# Patient Record
Sex: MALE | Race: WHITE
[De-identification: names, ages, dates, MRNs, and addresses within clinical notes are randomized per-mention and may not be internally consistent; named-entity substitution may affect disease eponyms.]

---

## 2017-01-21 ENCOUNTER — HOSPITAL ENCOUNTER (EMERGENCY)
Dept: HOSPITAL 25 - UCEAST | Age: 42
Discharge: HOME | End: 2017-01-21
Payer: COMMERCIAL

## 2017-01-21 VITALS — SYSTOLIC BLOOD PRESSURE: 128 MMHG | DIASTOLIC BLOOD PRESSURE: 85 MMHG

## 2017-01-21 DIAGNOSIS — Z88.1: ICD-10-CM

## 2017-01-21 DIAGNOSIS — Z88.5: ICD-10-CM

## 2017-01-21 DIAGNOSIS — R51: Primary | ICD-10-CM

## 2017-01-21 PROCEDURE — G0463 HOSPITAL OUTPT CLINIC VISIT: HCPCS

## 2017-01-21 PROCEDURE — 99212 OFFICE O/P EST SF 10 MIN: CPT

## 2017-01-21 NOTE — UC
Headache HPI





- HPI Summary


HPI Summary: 





The patient comes in today for:





1.   Headache:





Onset: 2-3 weeks ago. 


Palliative/provocative:  Nothing makes the pain better or worse.  


Quality: Throbbing.


Region:  Upper right tooth, or right maxillary sinus area pain.


Severity:  4/10


Time: Comes and goes. 


Associated symptoms:


   FEver: None.


   Rhinitis: None.


   Nasal congestion: None


   Epistaxis: None.


   Numbness: Right lateral corner of the eye. This started up today. It comes 

and goes.  


   Previous treatment:  He has seen a dentist x 3 in the last 10 days and the 

last visit was yesterday.  The patient states that his dentist did not think 

that he had a tooth problem and the patient has been referred to an endodontist 

(3 weeks from now).  He was started on Amoxicillin last one week ago. 








*








- History Of Current Complaint


Chief Complaint: UCDentalProblem


Stated Complaint: FACIAL PAIN


Hx Obtained From: Patient, Family/Caretaker





- Allergies/Home Medications


Allergies/Adverse Reactions: 


 Allergies











Allergy/AdvReac Type Severity Reaction Status Date / Time


 


Cefaclor [From [facility]] Allergy  Itching Verified 01/21/17 22:01


 


Morphine Allergy  Itching Verified 01/21/17 22:01











Home Medications: 


 Home Medications





Amoxicillin CAP* 500 mg PO TID 01/21/17 [History Confirmed 01/21/17]











PMH/Surg Hx/FS Hx/Imm Hx


Previously Healthy: Yes


Endocrine History Of: 


   Denies: Diabetes, Thyroid Disease, Hyperthyroidism, Hypothyroidism, 

Dyslipidemia


Cardiovascular History Of: 


   Denies: Cardiac Disorders, Hypertension, Pacemaker/ICD, Myocardial Infarction

, Congestive Heart Failure, Atrial Fibrillation, Deep Vein Thrombosis, Bleeding 

Disorders


Respiratory History Of: 


   Denies: COPD, Asthma, Bronchitis, Pneumonia, Pulmonary Embolism


GI/ History Of: 


   Denies: Gastroesophageal Reflux, Ulcer, Gastrointestinal Bleed, Gall Bladder 

Disease, Kidney Stones, Diverticulitis, Renal Disease, Urosepsis


Neurological History Of: 


   Denies: TIA, CVA, Dementia, Seizures, Migraine


Psychological History Of: 


   Denies: Anxiety, Depression, Bipolar Disorder, Schizophrenia, Post Traumatic 

Stress Disorder


Cancer History Of: 


   Denies: Lung Cancer, Colorectal Cancer, Breast Cancer, Prostate Cancer, 

Cervical Cancer


Other History Of: 


   Negative For: HIV, Hepatitis B, Hepatitis C, Anticoagulant Therapy





- Surgical History


Surgical History: Yes


Surgery Procedure, Year, and Place: TONSILS 2008





- Family History


Known Family History: Positive: Cardiac Disease, Hypertension





- Social History


Occupation: Employed Full-time


Alcohol Use: None


Substance Use Type: None


Smoking Status (MU): Never Smoked Tobacco





Review of Systems


Constitutional: Negative


Skin: Negative


Eyes: Negative


ENT: Negative


Respiratory: Negative


Cardiovascular: Negative


Gastrointestinal: Negative


Genitourinary: Negative


Musculoskeletal: Arthralgia, Myalgia


All Other Systems Reviewed And Are Negative: Yes





Physical Exam


Triage Information Reviewed: Yes


Appearance: Well-Appearing, No Pain Distress, Thin


Vital Signs: 


 Initial Vital Signs











Temp  99 F   01/21/17 21:58


 


Pulse  71   01/21/17 21:58


 


Resp  16   01/21/17 21:58


 


BP  128/85   01/21/17 21:58


 


Pulse Ox  100   01/21/17 21:58











Vital Signs Reviewed: Yes


Eyes: Positive: Conjunctiva Clear.  Negative: Discharge


ENT: Positive: Hearing grossly normal, Other: - No tenderness to palpation of 

the right side of the head.  There is no percussion tenderness of lesions of 

the oral cavity.  There is slight clicking of the TMJ bilaterally, but no 

tenderness to palpation of either joint.  There is transillumication of the 

frontal and maxillary sinuses..  Negative: Pharyngeal erythema, Nasal congestion

, Nasal drainage, TM bulging, TM dull, TM red, Tonsillar swelling, Tonsillar 

exudate


Dental: Negative: Gross Decay/Caries @, Dental Fracture @


Neck: Positive: Supple, Nontender, No Lymphadenopathy.  Negative: Nuchal 

Rigidity


Respiratory: Positive: Lungs clear, No respiratory distress, No accessory 

muscle use.  Negative: Rhonchi, Wheezing


Cardiovascular: Positive: RRR, No Murmur


Abdomen Description: Positive: Nontender, No Organomegaly, Soft.  Negative: 

Distended, Guarding


Musculoskeletal: Positive: Strength Intact, ROM Intact, No Edema


Neurological: Positive: Alert, Muscle Tone Normal


Psychological: Positive: Normal Response To Family, Age Appropriate Behavior, 

Consolable


Skin: Negative: rashes, breakdown





Headache Course/Dx





- Differential Dx/Diagnosis


Provider Diagnoses: Cephalgia, right side of head.





Discharge





- Discharge Plan


Condition: Stable


Disposition: HOME


Patient Education Materials:  General Headache (ED)


Referrals: 


Ramon Marin MD [Primary Care Provider] - 


Cryer,Jonathan, MD [Medical Doctor] - As Soon As Possible (Please contact Dr. Cryer's office as soon as you can for an appointment.)

## 2017-05-26 ENCOUNTER — HOSPITAL ENCOUNTER (EMERGENCY)
Dept: HOSPITAL 25 - UCEAST | Age: 42
Discharge: ANOTHER HOSPITAL | End: 2017-05-26
Payer: COMMERCIAL

## 2017-05-26 ENCOUNTER — HOSPITAL ENCOUNTER (EMERGENCY)
Dept: HOSPITAL 25 - ED | Age: 42
Discharge: HOME | End: 2017-05-26
Payer: COMMERCIAL

## 2017-05-26 VITALS — DIASTOLIC BLOOD PRESSURE: 77 MMHG | SYSTOLIC BLOOD PRESSURE: 128 MMHG

## 2017-05-26 VITALS — SYSTOLIC BLOOD PRESSURE: 130 MMHG | DIASTOLIC BLOOD PRESSURE: 90 MMHG

## 2017-05-26 DIAGNOSIS — Z88.5: ICD-10-CM

## 2017-05-26 DIAGNOSIS — M79.661: Primary | ICD-10-CM

## 2017-05-26 DIAGNOSIS — M77.9: ICD-10-CM

## 2017-05-26 DIAGNOSIS — Z88.1: ICD-10-CM

## 2017-05-26 LAB
ALBUMIN SERPL BCG-MCNC: 4.5 G/DL (ref 3.2–5.2)
ALP SERPL-CCNC: 84 U/L (ref 34–104)
ALT SERPL W P-5'-P-CCNC: 18 U/L (ref 7–52)
ANION GAP SERPL CALC-SCNC: 6 MMOL/L (ref 2–11)
AST SERPL-CCNC: 23 U/L (ref 13–39)
BUN SERPL-MCNC: 10 MG/DL (ref 6–24)
BUN/CREAT SERPL: 11.6 (ref 8–20)
CALCIUM SERPL-MCNC: 9.6 MG/DL (ref 8.6–10.3)
CHLORIDE SERPL-SCNC: 107 MMOL/L (ref 101–111)
GLOBULIN SER CALC-MCNC: 2.5 G/DL (ref 2–4)
GLUCOSE SERPL-MCNC: 105 MG/DL (ref 70–100)
HCO3 SERPL-SCNC: 26 MMOL/L (ref 22–32)
HCT VFR BLD AUTO: 47 % (ref 42–52)
HGB BLD-MCNC: 15.9 G/DL (ref 14–18)
MCH RBC QN AUTO: 31 PG (ref 27–31)
MCHC RBC AUTO-ENTMCNC: 34 G/DL (ref 31–36)
MCV RBC AUTO: 92 FL (ref 80–94)
POTASSIUM SERPL-SCNC: 3.8 MMOL/L (ref 3.5–5)
PROT SERPL-MCNC: 7 G/DL (ref 6.4–8.9)
RBC # BLD AUTO: 5.06 10^6/UL (ref 4–5.4)
SODIUM SERPL-SCNC: 139 MMOL/L (ref 133–145)
WBC # BLD AUTO: 5.3 10^3/UL (ref 3.5–10.8)

## 2017-05-26 PROCEDURE — 99212 OFFICE O/P EST SF 10 MIN: CPT

## 2017-05-26 PROCEDURE — 80053 COMPREHEN METABOLIC PANEL: CPT

## 2017-05-26 PROCEDURE — 36415 COLL VENOUS BLD VENIPUNCTURE: CPT

## 2017-05-26 PROCEDURE — 85379 FIBRIN DEGRADATION QUANT: CPT

## 2017-05-26 PROCEDURE — 99282 EMERGENCY DEPT VISIT SF MDM: CPT

## 2017-05-26 PROCEDURE — 93005 ELECTROCARDIOGRAM TRACING: CPT

## 2017-05-26 PROCEDURE — 85025 COMPLETE CBC W/AUTO DIFF WBC: CPT

## 2017-05-26 PROCEDURE — G0463 HOSPITAL OUTPT CLINIC VISIT: HCPCS

## 2017-05-26 NOTE — ED
Lower Extremity





- HPI Summary


HPI Summary: 





42 male presents from urgent care with complaints of lower right leg pain that 

began 2 days ago and worsened last night. Patient was sent here to rule out 

DVT. Patient is a very healthy amie who runs long distance daily. He has had 

chronic previous issues with both achilles tendons and lower leg muscles. He 

admits to this episode's pain being worse, more diffusely in his posterior calf 

and swelling that has resolved some, just in his right lower leg, since 

yesterday. Patient knows of a friend how just passed away from a undiagnosed 

PE. He has been taking Advil which does help with the pain and swelling. He is 

concerned he may have some. He also admits to some chest tightness that comes 

and goes however he admits to being anxious and relates it to that. Denies SOB 

and difficulty breathing. Is able to walk and bear weight. Running makes pain 

worse. Denies numbness/tingling and erythema. Denies any PMHx. He is vegan.





- History of Current Complaint


Chief Complaint: EDExtremityLower


Stated Complaint: R/O DVT-SENT FROM [facility]


Time Seen by Provider: 05/26/17 18:58


Hx Obtained From: Patient


Mechanism Of Injury: Unknown - running, overuse


Onset of Pain: Immediate


Onset/Duration: Days


Severity Initially: Mild


Severity Currently: Mild


Pain Intensity: 5


Pain Scale Used: 0-10 Numeric


Timing: Constant


Location: Is Discrete @ - lower right calf, leg


Character Of Pain: Sharp, Aching


Associated Signs And Symptoms: Positive: Swelling


Aggravating Factor(s): Standing, Ambulation


Alleviating Factor(s): Rest


Able to Bear Weight: Yes





- Allergies/Home Medications


Allergies/Adverse Reactions: 


 Allergies











Allergy/AdvReac Type Severity Reaction Status Date / Time


 


Cefaclor [From [facility]] Allergy  Itching Verified 05/26/17 17:23


 


Morphine Allergy  Itching Verified 05/26/17 17:23














PMH/Surg Hx/FS Hx/Imm Hx


Endocrine/Hematology History: 


   Denies: Hx Anticoagulant Therapy, Hx Diabetes, Hx Thyroid Disease


Cardiovascular History: 


   Denies: Hx Congestive Heart Failure, Hx Deep Vein Thrombosis, Hx 

Hypercholesterolemia, Hx Hypertension, Hx Myocardial Infarction, Hx Pacemaker/

ICD, Hx Peripheral Vascular Disease


Respiratory History: 


   Denies: Hx Asthma, Hx Chronic Obstructive Pulmonary Disease (COPD), Hx Lung 

Cancer, Hx Pneumonia, Hx Pulmonary Embolism


GI History: 


   Denies: Hx Gall Bladder Disease, Hx Gastrointestinal Bleed, Hx Ulcer, Hx 

Urosepsis


 History: 


   Denies: Hx Kidney Stones, Hx Renal Disease


Musculoskeletal History: 


   Denies: Hx Arthritis, Hx Rheumatoid Arthritis, Hx Osteoporosis


Sensory History: 


   Denies: Hx Cataracts, Hx Contacts or Glasses, Hx Glaucoma, Hx Hearing Aid


Opthamlomology History: 


   Denies: Hx Cataracts, Hx Contacts or Glasses, Hx Glaucoma


Neurological History: 


   Denies: Hx Dementia, Hx Headaches, Hx Migraine, Hx Seizures, Hx Transient 

Ischemic Attacks (TIA)


Psychiatric History: 


   Denies: Hx Anxiety, Hx Depression, Hx Panic Disorder, Hx Schizophrenia, Hx 

Bipolar Disorder





- Surgical History


Surgery Procedure, Year, and Place: TONSILS


Infectious Disease History: No


Infectious Disease History: 


   Denies: Traveled Outside the [place] in Last 30 Days





- Family History


Known Family History: Positive: Cardiac Disease, Hypertension





- Social History


Alcohol Use: None


Hx Substance Use: No


Substance Use Type: Reports: None


Hx Tobacco Use: No


Smoking Status (MU): Never Smoked Tobacco





Review of Systems


Constitutional: Negative


Cardiovascular: Negative


Respiratory: Negative


Gastrointestinal: Negative


Positive: Arthralgia, Myalgia, Edema - right lower leg 


Skin: Negative


Neurological: Negative


All Other Systems Reviewed And Are Negative: Yes





Physical Exam


Triage Information Reviewed: Yes


Vital Signs On Initial Exam: 


 Initial Vitals











Temp Pulse Resp BP Pulse Ox


 


 98.2 F   58   18   132/80   100 


 


 05/26/17 18:08  05/26/17 18:08  05/26/17 18:08  05/26/17 18:08  05/26/17 18:08








not tachy and not hypoxic


Vital Signs Reviewed: Yes


Appearance: Positive: Well-Appearing, No Pain Distress, Well-Nourished, Thin


Skin: Positive: Warm, Skin Color Reflects Adequate Perfusion, Dry.  Negative: 

Cold, Numb, Cyanosis @, Erythema @


Head/Face: Positive: Normal Head/Face Inspection


Eyes: Positive: Normal, Conjunctiva Clear


ENT: Positive: Normal ENT inspection, Hearing grossly normal


Neck: Positive: Supple, Nontender


Respiratory/Lung Sounds: Positive: Clear to Auscultation, Breath Sounds 

Present.  Negative: Decreased Breath Sounds, Rales, Rhonchi, Stridor, Wheezes


Cardiovascular: Positive: Normal, RRR, Pulses are Symmetrical in both Upper and 

Lower Extremities - 2+ pedal b/l, Leg Edema Right - minmal edema when compared 

to left.  Negative: Murmur, Rub, Leg Edema Left


Abdomen Description: Positive: Nontender, Soft


Bowel Sounds: Positive: Present


Musculoskeletal: Positive: Normal, Strength/ROM Intact, Pain @ - some 

discomfort on palpation of lower posterior calf and achilles, and with flexion. 

(-)ansari test, achilles tendon intact.  Negative: Limited @, Interruption @, 

Christopher Sign Left, Christopher Sign Right, Edema Left, Edema Right


Neurological: Positive: Normal, Sensory/Motor Intact, Alert, Oriented to Person 

Place, Time, CN Intact II-III, Reflexes Intact, NV Bundle Intact Distally, 

Normal Gait


Psychiatric: Positive: Normal





Diagnostics





- Vital Signs


 Vital Signs











  Temp Pulse Resp BP Pulse Ox


 


 05/26/17 18:12  98.2 F  59  18  132/80  100


 


 05/26/17 18:08  98.2 F  58  18  132/80  100














- Laboratory


Result Diagrams: 


 05/26/17 20:35





 05/26/17 20:35


Lab Statement: Any lab studies that have been ordered have been reviewed, and 

results considered in the medical decision making process.





- Ultrasound


  ** No standard instances


Ultrasound Interpretation: Positive (See Comments) - 1. No sonographic evidence 

of deep vein thrombosis. 2. Small focus of free fluid adjacent to the distal 

Achilles tendon. Please correlate to physical examination in this runner.


Ultrasound Interpretation Completed By: Radiologist





- EKG


  ** EKG


Cardiac Rate: NL


EKG Rhythm: Sinus Rhythm


ST Segment: Normal


Ectopy: None


EKG Interpretation: NSR


EKG Comparison: No Significant Change





Lower Extremity Course/Dx





- Course


Course Of Treatment: patient did not want anything for pain at this time. US 

obtained and negative for DVT. Did show some free fluid distal lower right leg, 

patient did have previous injury and runs alot. Due to patient's concern and 

with complains of chest tightness, D-Dimer and EKG obtained. Both normal. 

Continue use of NSAIDs, rest, ice and elevate. Refrain from strenuous phyical 

activity and running. Aware of worening signs and symptoms. Follow up PCP.





- Diagnoses


Differential Diagnosis/HQI/PQRI: Positive: Arthritis, Contusion, DVT, Sprain, 

Strain, Tendonitis


Provider Diagnoses: 


 Lower leg pain, Tendonitis








Discharge





- Discharge Plan


Condition: Stable


Disposition: HOME


Patient Education Materials:  Tendinitis (ED), Leg Pain (ED)


Referrals: 


Ramon Marin MD [Primary Care Provider] - 


Additional Instructions: 


Take NSAIDs such as Aleve or Advil to help with pain and inflammation for the 

next week. Take with food to avoid upset stomach.


Rest, elevated and ice the leg. Try warm compresses after 5 days of icing.


Refrain from running until symptoms subside. I'd give it at least 1 week. 


If your symptoms persist or worsen follow up with PCP for further evaluation 

and imaging.

## 2017-05-26 NOTE — RAD
HISTORY: 2 days of right calf pain with running.



TECHNIQUE: Multiple transverse and longitudinal ultrasound images were obtained of the

veins of the right lower extremity using grayscale, color Doppler, and spectral Doppler

imaging with and without compression and with augmentation. 



FINDINGS:



VEINS: The common femoral vein, deep femoral vein, femoral vein and popliteal vein are

compressible throughout their course, with normal flow on color Doppler imaging and normal

response to augmentation on spectral Doppler imaging.



SOFT TISSUES: A 4 x 6 mm fluid collection is noted immediately posterior to the distal

Achilles tendon.



IMPRESSION: 



1. No sonographic evidence of deep vein thrombosis.

2. Small focus of free fluid adjacent to the distal Achilles tendon. Please correlate to

physical examination in this runner.

## 2017-05-26 NOTE — UC
Lower Extremity/Ankle HPI





- HPI Summary


HPI Summary: 


right calf pain---getting worse over the past few days, no recent travel never 

had anything similar 








- History of Current Complaint


Chief Complaint: UCLowerExtremity


Stated Complaint: LEG INJURY


Time Seen by Provider: 05/26/17 17:26


Hx Obtained From: Patient


Onset/Duration: Sudden Onset, Lasting Days, Still Present, Worse Since - 

yesterday


Severity Initially: Moderate


Severity Currently: Moderate


Pain Intensity: 7


Pain Scale Used: 0-10 Numeric


Aggravating Factor(s): Standing, Ambulation


Alleviating Factor(s): Rest


Able to Bear Weight: Yes - with pain





- Allergies/Home Medications


Allergies/Adverse Reactions: 


 Allergies











Allergy/AdvReac Type Severity Reaction Status Date / Time


 


Cefaclor [From Ceclor] Allergy  Itching Verified 05/26/17 17:23


 


Morphine Allergy  Itching Verified 05/26/17 17:23











Home Medications: 


 Home Medications





Ibuprofen TAB* [Motrin TAB* 600 MG]  05/26/17 [History]











PMH/Surg Hx/FS Hx/Imm Hx


Previously Healthy: No


Endocrine History Of: 


   Denies: Diabetes, Thyroid Disease, Hyperthyroidism, Hypothyroidism, 

Dyslipidemia


Cardiovascular History Of: 


   Denies: Cardiac Disorders, Hypertension, Pacemaker/ICD, Myocardial Infarction

, Congestive Heart Failure, Atrial Fibrillation, Deep Vein Thrombosis, Bleeding 

Disorders


Respiratory History Of: 


   Denies: COPD, Asthma, Bronchitis, Pneumonia, Pulmonary Embolism


GI/ History Of: 


   Denies: Gastroesophageal Reflux, Ulcer, Gastrointestinal Bleed, Gall Bladder 

Disease, Kidney Stones, Diverticulitis, Renal Disease, Urosepsis


Neurological History Of: 


   Denies: TIA, CVA, Dementia, Seizures, Migraine


Psychological History Of: 


   Denies: Anxiety, Depression, Bipolar Disorder, Schizophrenia, Post Traumatic 

Stress Disorder


Cancer History Of: 


   Denies: Lung Cancer, Colorectal Cancer, Breast Cancer, Prostate Cancer, 

Cervical Cancer


Other History Of: 


   Negative For: HIV, Hepatitis B, Hepatitis C, Anticoagulant Therapy





- Surgical History


Surgical History: Yes


Surgery Procedure, Year, and Place: TONSILS





- Family History


Known Family History: Positive: Cardiac Disease, Hypertension, Other - ITP, 

Breast Cancer





- Social History


Occupation: Employed Full-time


Lives: With Family


Alcohol Use: None


Substance Use Type: None


Smoking Status (MU): Never Smoked Tobacco





Review of Systems


Constitutional: Negative


Skin: Negative


Eyes: Negative


ENT: Negative


Respiratory: Negative


Cardiovascular: Negative


Gastrointestinal: Negative


Genitourinary: Negative


Motor: Negative


Neurovascular: Negative


Musculoskeletal: Myalgia - right posterior calf pain


Neurological: Negative


Psychological: Negative


All Other Systems Reviewed And Are Negative: Yes





Physical Exam


Triage Information Reviewed: Yes


Appearance: Well-Appearing, No Pain Distress, Well-Nourished


Vital Signs: 


 Initial Vital Signs











Temp  98.1 F   05/26/17 17:19


 


Pulse  62   05/26/17 17:19


 


Resp  12   05/26/17 17:19


 


BP  128/77   05/26/17 17:19


 


Pulse Ox  99   05/26/17 17:19











Eye Exam: Normal


Eyes: Positive: Conjunctiva Clear


ENT Exam: Normal


ENT: Positive: Normal ENT inspection, Hearing grossly normal, Pharynx normal, 

TMs normal.  Negative: Nasal congestion, Nasal drainage, Tonsillar swelling, 

Tonsillar exudate, Trismus, Muffled/hoarse voice


Dental Exam: Normal


Neck exam: Normal


Neck: Positive: Supple, Nontender, No Lymphadenopathy


Respiratory Exam: Normal


Respiratory: Positive: Chest non-tender, Lungs clear, Normal breath sounds, No 

respiratory distress, No accessory muscle use


Cardiovascular Exam: Normal


Cardiovascular: Positive: RRR, No Murmur, Pulses Normal, Brisk Capillary Refill


Musculoskeletal Exam: Normal


Musculoskeletal: Positive: Strength Intact, ROM Intact, No Edema


Neurological Exam: Normal


Neurological: Positive: Alert, Muscle Tone Normal


Psychological Exam: Normal


Skin Exam: Normal





Lower Extremity Course/Dx





- Course


Course Of Treatment: trasfer to ed for higher level of care





- Differential Dx/Diagnosis


Differential Diagnosis/HQI/PQRI: Contusion, DVT, Fracture (Closed), Sprain, 

Strain


Provider Diagnoses: Right Calf Pain





- Physician Notifications


Discussed Patient Care With: Dr. Lofton


Time Discussed With Above Provider: 17:40


Instructed by Provider To: Transfer





Discharge





- Discharge Plan


Condition: Stable


Disposition: TRANS [facility] OF CARE FAC


Referrals: 


Ramon Marin MD [Primary Care Provider] -

## 2017-07-18 ENCOUNTER — HOSPITAL ENCOUNTER (EMERGENCY)
Dept: HOSPITAL 25 - UCEAST | Age: 42
Discharge: HOME | End: 2017-07-18
Payer: COMMERCIAL

## 2017-07-18 VITALS — SYSTOLIC BLOOD PRESSURE: 117 MMHG | DIASTOLIC BLOOD PRESSURE: 74 MMHG

## 2017-07-18 DIAGNOSIS — H04.431: Primary | ICD-10-CM

## 2017-07-18 DIAGNOSIS — Z88.5: ICD-10-CM

## 2017-07-18 DIAGNOSIS — Z88.1: ICD-10-CM

## 2017-07-18 PROCEDURE — 99211 OFF/OP EST MAY X REQ PHY/QHP: CPT

## 2017-07-18 PROCEDURE — G0463 HOSPITAL OUTPT CLINIC VISIT: HCPCS

## 2017-07-18 NOTE — UC
Eye Complaint HPI





- HPI Summary


HPI Summary: 


feels he may have a fb in right eye








- History of Current Complaint


Hx Obtained From: Patient


Onset/Duration: Sudden Onset


Timing: Constant


Severity Initially: Mild


Location of Injury: Conjunctiva


Character: Foreign Body Sensation


Aggravating Factor(s): Nothing


Alleviating Factor(s): Nothing


Associated Signs And Symptoms: Positive: Negative





<Margot Guerrero - Last Filed: 07/21/17 08:29>





<Alexandra Vidales - Last Filed: 07/21/17 08:40>





- History of Current Complaint


Chief Complaint: UCEye


Stated Complaint: FB IN EYE


Time Seen by Provider: 07/18/17 21:41





- Allergies/Home Medications


Allergies/Adverse Reactions: 


 Allergies











Allergy/AdvReac Type Severity Reaction Status Date / Time


 


Cefaclor [From Ceclor] Allergy  Itching Verified 05/26/17 17:23


 


Morphine Allergy  Itching Verified 05/26/17 17:23














PMH/Surg Hx/FS Hx/Imm Hx


Previously Healthy: Yes


Other History Of: 


   Negative For: HIV, Hepatitis B, Hepatitis C, Anticoagulant Therapy





- Surgical History


Surgical History: Yes


Surgery Procedure, Year, and Place: TONSILS





- Family History


Known Family History: Positive: Cardiac Disease, Hypertension, Other - ITP, 

Breast Cancer





- Social History


Occupation: Employed Full-time


Lives: With Family


Alcohol Use: None


Substance Use Type: None


Smoking Status (MU): Never Smoked Tobacco





<Margto Guerrero - Last Filed: 07/21/17 08:29>





Review of Systems


Constitutional: Negative


Skin: Negative


Eyes: Negative


ENT: Negative


Respiratory: Negative


Cardiovascular: Negative


Gastrointestinal: Negative


Genitourinary: Negative


Motor: Negative


Neurovascular: Negative


Musculoskeletal: Negative


Neurological: Negative


Psychological: Negative


All Other Systems Reviewed And Are Negative: Yes





<Margot Guerrero - Last Filed: 07/21/17 08:29>





Physical Exam


Triage Information Reviewed: Yes


Appearance: Well-Appearing, No Pain Distress, Well-Nourished


Vital Signs: 


 Initial Vital Signs











Temp  98.5 F   07/18/17 21:15


 


Pulse  66   07/18/17 21:15


 


Resp  18   07/18/17 21:15


 


BP  117/74   07/18/17 21:15


 


Pulse Ox  100   07/18/17 21:15











Vital Signs Reviewed: Yes


Eye Exam: Normal


Eyes: Positive: Conjunctiva Clear, Other: - no stained and lid flipped no FB 

observed does have a mucocele in medial Upper globe


ENT Exam: Normal


ENT: Positive: Normal ENT inspection, Hearing grossly normal.  Negative: Nasal 

congestion, Nasal drainage, Trismus, Muffled/hoarse voice


Dental Exam: Normal


Neck exam: Normal


Neck: Positive: 1


Respiratory Exam: Normal


Respiratory: Positive: Chest non-tender, No respiratory distress, No accessory 

muscle use


Cardiovascular Exam: Normal


Cardiovascular: Positive: RRR, Pulses Normal, Brisk Capillary Refill


Musculoskeletal Exam: Normal


Musculoskeletal: Positive: Strength Intact, ROM Intact, No Edema


Neurological Exam: Normal


Neurological: Positive: Alert, Muscle Tone Normal


Psychological Exam: Normal


Skin Exam: Normal





<Margot Guerrero - Last Filed: 07/21/17 08:29>


Vital Signs: 


 Initial Vital Signs











Temp  98.5 F   07/18/17 21:15


 


Pulse  66   07/18/17 21:15


 


Resp  18   07/18/17 21:15


 


BP  117/74   07/18/17 21:15


 


Pulse Ox  100   07/18/17 21:15














<Alexandra Vidales - Last Filed: 07/21/17 08:40>





Eye Complaint Course/Dx





- Course


Course Of Treatment: moistening eye dropps follow with opthomology this week as 

needed





- Differential Dx/Diagnosis


Differential Diagnosis/HQI/PQRI: Corneal Abrasion, Penetrating Injury, Orbital 

Cellulitis, Other - OD mucocele


Provider Diagnoses: OD Mucocele





<Margot Guerrero - Last Filed: 07/21/17 08:29>





Discharge





<Margot Guerrero - Last Filed: 07/21/17 08:29>





<Alexandra Vidales - Last Filed: 07/21/17 08:40>





- Discharge Plan


Condition: Stable


Disposition: HOME


Patient Education Materials:  Ibuprofen (By mouth), Eye Foreign Body (ED)


Referrals: 


Rupert Chisholm MD [Medical Doctor] - If Needed





Attestation Statement


User Type: Provider - I was available for consult. This patient was seen by the 

AYDEN. The patient was not presented to, seen by, or examined by me. -Cheikh





<Alexandra Vidales - Last Filed: 07/21/17 08:40>

## 2018-01-25 ENCOUNTER — HOSPITAL ENCOUNTER (EMERGENCY)
Dept: HOSPITAL 25 - ED | Age: 43
Discharge: HOME | End: 2018-01-25
Payer: COMMERCIAL

## 2018-01-25 VITALS — DIASTOLIC BLOOD PRESSURE: 77 MMHG | SYSTOLIC BLOOD PRESSURE: 119 MMHG

## 2018-01-25 DIAGNOSIS — R51: Primary | ICD-10-CM

## 2018-01-25 PROCEDURE — 87798 DETECT AGENT NOS DNA AMP: CPT

## 2018-01-25 PROCEDURE — 99282 EMERGENCY DEPT VISIT SF MDM: CPT

## 2018-01-25 PROCEDURE — 86790 VIRUS ANTIBODY NOS: CPT

## 2018-01-26 NOTE — ED
Daryn ABRAHAM Angela, scribed for Vadim Gil MD on 01/25/18 at 1917 .





Headache





- HPI Summary


HPI Summary: 





This pt is a 43 y[facility] the 

past week. Pt reports he was in [place] for 10 days, and returned home 8 

days ago. He states the first time he had a headache, it was in [facility]. [place] 

[place] and used insect 

repellent. He describes his headache as a shooting pain on his temples which 

lasted for a few seconds. Pt notes that he has had more intermittent headaches 

since then, with more frequency in "different parts" of his head bilaterally. 

He additionally states he gets dizzy when closing his eyes. Pt currently denies 

any headache or dizziness. He denies fever, rash, sinus tenderness. 


Pt reports he hurt his neck when going down a water slide in [place]. He 

notes he was trying to get it back into place in [facility], but his neck pain 

was aggravated from this injury. He has a history of neck "coming out of 

adjustment." Pt has seen physical therapist and chiropractor. He had an 

adjustment of his neck last year.


Last year, he states he had pain in his face and teeth with numbness, 

exclusively on the right side but it resolved on its own. Pt had a recent MRI 

done which resulted negative. No sinus tenderness currently.





- History Of Current Complaint


Chief Complaint: EDHeadache


Stated Complaint: HEADACHE/DIZZY


Time Seen by Provider: 01/25/18 19:02


Hx Obtained From: Patient


Onset/Duration: Started days ago, Still Present


Timing: Intermittent, Lasting: - seconds


Character: Sharp - shooting


Location of Headache: Diffuse


Aggravating Factor: Nothing


Allevating Factors: Nothing


Associated Signs And Symptoms: Dizziness - when closing eyes





- Allergies/Home Medications


Allergies/Adverse Reactions: 


 Allergies











Allergy/AdvReac Type Severity Reaction Status Date / Time


 


Cefaclor [From [facility]] Allergy  Itching Verified 05/26/17 17:23


 


Morphine Allergy  Itching Verified 05/26/17 17:23














PMH/Surg Hx/FS Hx/Imm Hx


Endocrine/Hematology History: 


   Denies: Hx Anticoagulant Therapy, Hx Diabetes, Hx Thyroid Disease


Cardiovascular History: 


   Denies: Hx Congestive Heart Failure, Hx Deep Vein Thrombosis, Hx 

Hypercholesterolemia, Hx Hypertension, Hx Myocardial Infarction, Hx Pacemaker/

ICD, Hx Peripheral Vascular Disease


Respiratory History: 


   Denies: Hx Asthma, Hx Chronic Obstructive Pulmonary Disease (COPD), Hx Lung 

Cancer, Hx Pneumonia, Hx Pulmonary Embolism


GI History: 


   Denies: Hx Gall Bladder Disease, Hx Gastrointestinal Bleed, Hx Ulcer, Hx 

Urosepsis


 History: 


   Denies: Hx Kidney Stones, Hx Renal Disease


Musculoskeletal History: 


   Denies: Hx Arthritis, Hx Rheumatoid Arthritis, Hx Osteoporosis


Sensory History: 


   Denies: Hx Cataracts, Hx Contacts or Glasses, Hx Glaucoma, Hx Hearing Aid


Opthamlomology History: 


   Denies: Hx Cataracts, Hx Contacts or Glasses, Hx Glaucoma


Neurological History: 


   Denies: Hx Dementia, Hx Headaches, Hx Migraine, Hx Seizures, Hx Transient 

Ischemic Attacks (TIA)


Psychiatric History: 


   Denies: Hx Anxiety, Hx Depression, Hx Panic Disorder, Hx Schizophrenia, Hx 

Bipolar Disorder





- Surgical History


Surgery Procedure, Year, and Place: TONSILS


Infectious Disease History: No


Infectious Disease History: Reports: Traveled Outside the  in Last 30 Days - 

[facility]





- Family History


Known Family History: Positive: Cardiac Disease, Hypertension, Other - ITP, 

Breast Cancer





- Social History


Alcohol Use: None


Hx Substance Use: No


Substance Use Type: Reports: None


Hx Tobacco Use: No


Smoking Status (MU): Never Smoked Tobacco





Review of Systems


Negative: Fever, Chills


Respiratory: Negative


Gastrointestinal: Negative


Negative: Rash


Neurological: Other - NEG: dizziness 


Negative: Headache, Numbness


All Other Systems Reviewed And Are Negative: Yes





Physical Exam





- Summary


Physical Exam Summary: 





Appearance: Well appearing, no pain distress


Skin: warm, dry, reflects adequate perfusion


Head/face: normal. No sinus tenderness.


Eyes: EOMI, FELICIANO. 


ENT: normal


Neck: supple, non-tender. Full ROM. No crepitance.


Respiratory: CTA, breath sounds present


Cardiovascular: RRR, pulses symmetrical 


Abdomen: non-tender, soft


Bowel: present


Musculoskeletal: normal, strength/ROM intact


Neuro: normal, sensory motor intact, A&Ox3


Triage Information Reviewed: Yes


Vital Signs On Initial Exam: 


 Initial Vitals











Temp Pulse Resp BP Pulse Ox


 


 98.8 F   72   16   121/80   99 


 


 01/25/18 17:34  01/25/18 17:34  01/25/18 17:34  01/25/18 17:34  01/25/18 17:34











Vital Signs Reviewed: Yes





Diagnostics





- Vital Signs


 Vital Signs











  Temp Pulse Resp BP Pulse Ox


 


 01/25/18 17:34  98.8 F  72  16  121/80  99














- Laboratory


Lab Statement: Any lab studies that have been ordered have been reviewed, and 

results considered in the medical decision making process.





Headache Course/Dx





- Course


Course Of Treatment: pt with sharp facial or head pain lasting only seconds. No 

injury. No fever but recent travel to [facility]. Dengue and Lyme added here. 

Had recent MRIs of brain etc for similar R sided facial pains. No pain here. F/

U closely with PMD and referred to neurology.





- Diagnoses


Provider Diagnoses: 


 Nonspecific headache








Discharge





- Discharge Plan


Condition: Good


Disposition: HOME


Patient Education Materials:  Acute Headache (ED), Cervical Radiculopathy (ED)


Referrals: 


Karly Dhillon MD [Medical Doctor] - 


Ramon Marin MD [Primary Care Provider] - 


Additional Instructions: 


Call for appt with the neurologist provided.


Call your doctor in the morning for reevaluation and for possibly obtaining MRI 

of head/neck


Hydrated. Ibuprofen as needed


Return with fever, vomiting, increased pain, worse, new symptoms or other 

concerns as discussed.





The documentation as recorded by the Daryn slater Angela accurately reflects 

the service I personally performed and the decisions made by me, Vadim Gil MD.

## 2018-01-30 ENCOUNTER — HOSPITAL ENCOUNTER (EMERGENCY)
Dept: HOSPITAL 25 - ED | Age: 43
Discharge: HOME | End: 2018-01-30
Payer: COMMERCIAL

## 2018-01-30 VITALS — SYSTOLIC BLOOD PRESSURE: 104 MMHG | DIASTOLIC BLOOD PRESSURE: 64 MMHG

## 2018-01-30 DIAGNOSIS — R51: Primary | ICD-10-CM

## 2018-01-30 DIAGNOSIS — Z88.8: ICD-10-CM

## 2018-01-30 DIAGNOSIS — Z88.5: ICD-10-CM

## 2018-01-30 LAB
BASOPHILS # BLD AUTO: 0.1 10^3/UL (ref 0–0.2)
EOSINOPHIL # BLD AUTO: 0 10^3/UL (ref 0–0.6)
HCT VFR BLD AUTO: 45 % (ref 42–52)
HGB BLD-MCNC: 15.7 G/DL (ref 14–18)
INR PPP/BLD: 1 (ref 0.77–1.02)
LYMPHOCYTES # BLD AUTO: 0.8 10^3/UL (ref 1–4.8)
MCH RBC QN AUTO: 32 PG (ref 27–31)
MCHC RBC AUTO-ENTMCNC: 35 G/DL (ref 31–36)
MCV RBC AUTO: 91 FL (ref 80–94)
MONOCYTES # BLD AUTO: 0.4 10^3/UL (ref 0–0.8)
NEUTROPHILS # BLD AUTO: 4.7 10^3/UL (ref 1.5–7.7)
NRBC # BLD AUTO: 0 10^3/UL
NRBC BLD QL AUTO: 0.1
PLATELET # BLD AUTO: 200 10^3/UL (ref 150–450)
RBC # BLD AUTO: 4.91 10^6/UL (ref 4–5.4)
WBC # BLD AUTO: 6.1 10^3/UL (ref 3.5–10.8)

## 2018-01-30 PROCEDURE — 70496 CT ANGIOGRAPHY HEAD: CPT

## 2018-01-30 PROCEDURE — 99282 EMERGENCY DEPT VISIT SF MDM: CPT

## 2018-01-30 PROCEDURE — 36415 COLL VENOUS BLD VENIPUNCTURE: CPT

## 2018-01-30 PROCEDURE — 85610 PROTHROMBIN TIME: CPT

## 2018-01-30 PROCEDURE — 85025 COMPLETE CBC W/AUTO DIFF WBC: CPT

## 2018-01-30 PROCEDURE — 80053 COMPREHEN METABOLIC PANEL: CPT

## 2018-01-30 PROCEDURE — 70498 CT ANGIOGRAPHY NECK: CPT

## 2018-01-30 NOTE — ED
Headache





- HPI Summary


HPI Summary: 





41 y/o male presents to [facility] with c/o intermittent headache for the past 2 

weeks. Pt reports he was in [place] for 10 days, and returned home 8 days 

ago. He states the first time he had a headache, it was in [facility]. [place] 

[place] and used insect 

repellent. He describes his headache as a shooting pain that is in random areas 

throughout head, which only last for a few seconds. Pt notes that he has had 

more intermittent headaches since then, with more frequency in "different parts

" of his head bilaterally. He additionally states he gets dizzy when closing 

his eyes. Pt currently denies any headache or dizziness. He denies fever, rash, 

sinus tenderness. He has a history of neck "coming out of adjustment." Pt has 

seen physical therapist and chiropractor. He had an adjustment of his neck last 

year. Also had neurology and ENT consult last year with MRI without abnormal 

findings for similar symptoms a year ago. States the pain he had last year was 

pain in his face and teeth with numbness, exclusively on the right side but it 

resolved on its own. Patient states he was here 5 days ago and sent home 

without abnormal findings, however returns today because he experienced a 

numbness/tingling sensation in the left side of his neck and ear, that has 

since resolved however it made him very nervous. States he called the 

neurologist and PCP and is unable to get in until March, and he feels that is 

not soon enough. Described his dizziness/vision to be "fluttery". No other 

complaints. Otherwise healthy without PMHx. No medications. No nausea/vomiting. 

Nothing makes it better or worse.





- History Of Current Complaint


Chief Complaint: EDNeurologicalDeficit


Stated Complaint: HEADACHE


Time Seen by Provider: 01/30/18 08:25


Hx Obtained From: Patient


Onset/Duration: Sudden Onset, Started weeks ago, Still Present


Currently Pain Is: Current Pain Scale(0-10)= - 0


Timing: Intermittent, Lasting:, Seconds


Character: Sharp


Location of Headache: Other: - differnt locations each episode


Aggravating Factor: Nothing


Allevating Factors: Nothing


Associated Signs And Symptoms: Dizziness, Visual Changes - "fluttery/dizzy" at 

times





- Risk Factors


SAH Risk Factors: Negative


Meningitis Risk Factors: Negative


SDH Risk Factors: Negative


Temporal Arteritis Risk Factors: Negative





- Allergies/Home Medications


Allergies/Adverse Reactions: 


 Allergies











Allergy/AdvReac Type Severity Reaction Status Date / Time


 


MS Cefaclor [From [facility]] Allergy  Itching Verified 05/26/17 17:23


 


MS Morphine [Morphine] Allergy  Itching Verified 05/26/17 17:23














PMH/Surg Hx/FS Hx/Imm Hx


Endocrine/Hematology History: 


   Denies: Hx Anticoagulant Therapy, Hx Diabetes, Hx Thyroid Disease


Cardiovascular History: 


   Denies: Hx Congestive Heart Failure, Hx Deep Vein Thrombosis, Hx 

Hypercholesterolemia, Hx Hypertension, Hx Myocardial Infarction, Hx Pacemaker/

ICD, Hx Peripheral Vascular Disease


Respiratory History: 


   Denies: Hx Asthma, Hx Chronic Obstructive Pulmonary Disease (COPD), Hx Lung 

Cancer, Hx Pneumonia, Hx Pulmonary Embolism


GI History: 


   Denies: Hx Gall Bladder Disease, Hx Gastrointestinal Bleed, Hx Ulcer, Hx 

Urosepsis


 History: 


   Denies: Hx Kidney Stones, Hx Renal Disease


Musculoskeletal History: 


   Denies: Hx Arthritis, Hx Rheumatoid Arthritis, Hx Osteoporosis


Sensory History: 


   Denies: Hx Cataracts, Hx Contacts or Glasses, Hx Glaucoma, Hx Hearing Aid


Opthamlomology History: 


   Denies: Hx Cataracts, Hx Contacts or Glasses, Hx Glaucoma


Neurological History: 


   Denies: Hx Dementia, Hx Headaches, Hx Migraine, Hx Seizures, Hx Transient 

Ischemic Attacks (TIA)


Psychiatric History: 


   Denies: Hx Anxiety, Hx Depression, Hx Panic Disorder, Hx Schizophrenia, Hx 

Bipolar Disorder





- Surgical History


Surgery Procedure, Year, and Place: TONSILS





- Immunization History


Immunizations Up to Date: Yes


Infectious Disease History: No


Infectious Disease History: 


   Denies: Traveled Outside the [place] in Last 30 Days





- Family History


Known Family History: Positive: Cardiac Disease, Hypertension, Other - ITP, 

Breast Cancer





- Social History


Alcohol Use: None


Hx Substance Use: No


Substance Use Type: Reports: None


Hx Tobacco Use: No


Smoking Status (MU): Never Smoked Tobacco





Review of Systems


Constitutional: Negative


Positive: Other - vision is sometimes "fluttery" when closing his eyes 


Cardiovascular: Negative


Respiratory: Negative


Gastrointestinal: Negative


Neurological: Other - dizziness


Positive: Headache, Paresthesia - left neck 


All Other Systems Reviewed And Are Negative: Yes





Physical Exam


Triage Information Reviewed: Yes


Vital Signs On Initial Exam: 


 Initial Vitals











Temp Pulse Resp BP Pulse Ox


 


 97.9 F   81   18   142/97   100 


 


 01/30/18 08:17  01/30/18 08:17  01/30/18 08:17  01/30/18 08:17  01/30/18 08:17











Vital Signs Reviewed: Yes


Appearance: Positive: Well-Appearing, No Pain Distress, Well-Nourished


Skin: Positive: Warm, Skin Color Reflects Adequate Perfusion, Dry.  Negative: 

Cold, Soft, Diaphoretic, Pale


Head/Face: Positive: Normal Head/Face Inspection.  Negative: Scalp


Eyes: Positive: Normal, EOMI, FELICIANO, Conjunctiva Clear


ENT: Positive: Pharynx normal


Dental: Negative: Percussion Tenderness @, Gross Decay/Caries @, Dental 

Fracture @


Neck: Positive: Supple, Nontender, No Lymphadenopathy


Respiratory/Lung Sounds: Positive: Clear to Auscultation, Breath Sounds 

Present.  Negative: Decreased Breath Sounds, Rales, Rhonchi, Wheezes


Cardiovascular: Positive: Normal, RRR, Pulses are Symmetrical in both Upper and 

Lower Extremities.  Negative: Murmur, Rub


Abdomen Description: Positive: Nontender, No Organomegaly


Bowel Sounds: Positive: Present


Musculoskeletal: Positive: Normal, Strength/ROM Intact.  Negative: Limited @, 

Interruption @, Abnormal @, Pain @


Neurological: Positive: Normal - memory and cocentration intact, normal neuro 

exam, Sensory/Motor Intact, Alert, Oriented to Person Place, Time, CN Intact II-

III, NV Bundle Intact Distally, Normal Gait, Heel to Toe - normal, Finger to 

Nose - normal, Facial Symmetry, Speech Normal.  Negative: Rhomberg - negative





- Gardiner Coma Scale


Best Eye Response: 4 - Spontaneous


Best Motor Response: 6 - Obeys Commands


Best Verbal Response: 5 - Oriented


Coma Scale Total: 15





Diagnostics





- Vital Signs


 Vital Signs











  Temp Pulse Resp BP Pulse Ox


 


 01/30/18 08:17  97.9 F  81  18  142/97  100














- Laboratory


Result Diagrams: 


 01/30/18 09:50





 01/30/18 09:50


Lab Statement: Any lab studies that have been ordered have been reviewed, and 

results considered in the medical decision making process.





Headache Course/Dx





- Course


Course Of Treatment: CTA head and neck and labs obtained due to patient concern/

request. All without abnormal findings. Patient comfortable without abnormal PE 

findings or vitals and no pain throughout stay in ED. Patient was worried about 

tumors, CVA, aneurysm etc. Completely normal neuro exam without deficit. 

Patient educated on possible cause of symptoms being from complex migraine/non 

specific headache. Also educated on possible muscular tension or cervical 

radiculopathy etiology. Recommend further evaluation and work up outpatient as 

emergent etiologies eliminated at this time. Patient is aware and understands. 

Agrees with plan. All questions thouroughly answered. Aware of worsening signs/

symptoms to watch out for (fever, deficits, increased pain etc). Also informed 

negative lyme and degume is still pending from visit 5 days ago. Also 

recommended if symptoms worsen/new develop possible infectious disease follow 

up may be recommended. Discuss Dr Cortez who agreed with plan.





- Diagnoses


Differential Diagnosis/HQI/PQRI: Migraine, Tension Headache, Other - complex 

migraine


Provider Diagnoses: 


 Headache








- Physician Notifications


Discussed Care Of Patient With: Dr Cortez





Discharge





- Discharge Plan


Condition: Stable


Disposition: HOME


Patient Education Materials:  Acute Headache (ED)


Referrals: 


Ramon Marin MD [Primary Care Provider] - 


Amie Keating MD [Medical Doctor] - 


Additional Instructions: 


Please follow up with PCP and neurology for further evaluation and testing.


Increase fluid intake, refrain from increasing intracranial pressure (lifting), 

and try excedrin.


Any new or worsening symptoms please seek medical attention as discussed (

increased pain, fever, vomiting, slurred speech, numbness of extremity, altered 

mental status, etc)

## 2018-01-30 NOTE — RAD
HISTORY: Head pain, numbness and tingling in the neck, left side of neck



COMPARISONS: MRA dated May 11, 2017



TECHNIQUE: Multiple contiguous axial CT scans were obtained of the head and neck after the

administration of nonionic intravenous contrast timed to the systemic arterial phase of

contrast enhancement. Coronal and sagittal multiplanar reformations are submitted for

review. Multiple 3-D maximum intensity projection reconstructions are also submitted for

review.    



FINDINGS:



CTA NECK:



AORTIC ARCH: There is a normal three-vessel branching pattern of the aortic arch. There is

no ostial or proximal stenosis of the cephalic great vessels.



RIGHT VERTEBRAL ARTERY: The right vertebral artery is patent along its course, without

stenosis.

LEFT VERTEBRAL ARTERY: The left vertebral artery is patent along its course, without

stenosis.

DOMINANCE: The left vertebral artery is dominant.





RIGHT COMMON CAROTID ARTERY: The right common carotid artery is patent. The right carotid

bifurcation occurs at C3-C4

RIGHT INTERNAL CAROTID ARTERY: There is no right internal carotid artery stenosis by

NASCET criteria. 

RIGHT EXTERNAL CAROTID ARTERY: The right external carotid artery is unremarkable.



LEFT COMMON CAROTID ARTERY: The left common carotid artery is patent. The left carotid

bifurcation occurs at C3-C4

LEFT INTERNAL CAROTID ARTERY: There is no left internal carotid artery stenosis by NASCET

criteria. 

LEFT EXTERNAL CAROTID ARTERY: The left external carotid artery is unremarkable.



VENOUS CIRCULATION: The venous system is unremarkable.



SALIVARY GLANDS: The parotid glands, submandibular glands, sublingual glands are normal.



NASAL CAVITY/NASOPHARYNX: The nasal cavity and nasopharynx are normal.



ORAL CAVITY/OROPHARYNX: The oral cavity is obscured by streak artifact from dental

amalgam. The visualized oral cavity and oropharynx are unremarkable.

LARYNGEAL APPARATUS/HYPOPHARYNX: The laryngeal apparatus and hypopharynx are normal.



UPPER AIRWAY/UPPER ESOPHAGUS: The visualized upper airway and esophagus are normal.

LUNG APICES: The lung apices are clear.



THYROID GLAND: The thyroid gland is normal.



LYMPH NODES: There is no lymphadenopathy by size criteria.



BONES AND SOFT TISSUES: There is a mild scoliotic curvature of the spine.





CTA HEAD:



INTRACRANIAL CIRCULATION: There is no aneurysm, vascular malformation, occlusion, or

stenosis of the visualized intracranial circulation. The anterior communicating artery

complex is clear. There is a fetal origin of the left posterior cerebral artery.



VENOUS CIRCULATION: The venous system is unremarkable.



PERFUSION: There is no obvious parenchymal perfusion deficit.



HEMORRHAGE/INFARCT: There is no hemorrhage or acute infarct.

MASSES/SHIFT: There is no mass or shift.

EXTRA-AXIAL SPACES: There are no extra-axial fluid collections.

SULCI AND VENTRICLES: The sulci and ventricles are normal in size and position for the

patient's stated age.



CEREBRUM: There are no focal parenchymal abnormalities. 

BRAINSTEM: There are no focal parenchymal abnormalities.

CEREBELLUM: There are no focal parenchymal abnormalities.



PARANASAL SINUSES: The paranasal sinuses are clear.

ORBITS: The orbits are unremarkable.

BONES AND SOFT TISSUE: No bone or soft tissue abnormalities are noted.



OTHER: There is no abnormal enhancement.





IMPRESSION: 

1.  NO INTERNAL CAROTID ARTERY STENOSIS BY NASCET CRITERIA.

2.  NO ANEURYSM, VASCULAR MALFORMATION, OCCLUSION, OR STENOSIS OF THE VISUALIZED

INTRACRANIAL CIRCULATION.



CPT II Codes: 3100F

## 2018-02-25 ENCOUNTER — HOSPITAL ENCOUNTER (EMERGENCY)
Dept: HOSPITAL 25 - ED | Age: 43
Discharge: HOME | End: 2018-02-25
Payer: COMMERCIAL

## 2018-02-25 VITALS — DIASTOLIC BLOOD PRESSURE: 75 MMHG | SYSTOLIC BLOOD PRESSURE: 116 MMHG

## 2018-02-25 DIAGNOSIS — Z88.1: ICD-10-CM

## 2018-02-25 DIAGNOSIS — R20.2: Primary | ICD-10-CM

## 2018-02-25 DIAGNOSIS — Z88.5: ICD-10-CM

## 2018-02-25 PROCEDURE — 99282 EMERGENCY DEPT VISIT SF MDM: CPT

## 2018-02-25 PROCEDURE — 36415 COLL VENOUS BLD VENIPUNCTURE: CPT

## 2018-02-25 PROCEDURE — 83735 ASSAY OF MAGNESIUM: CPT

## 2018-02-25 PROCEDURE — 80053 COMPREHEN METABOLIC PANEL: CPT

## 2018-02-25 NOTE — RAD
Indication: Right leg edema.



Duplex Doppler sonography of the deep venous system of the right lower extremity deep

venous system was performed.



Bilaterally the common femoral veins appear patent and compressible. Right proximal

greater saphenous vein, proximal deep femoral vein, femoral vein, popliteal vein,

posterior tibial veins and peroneal veins appear patent and compressible.   



IMPRESSION: NO EVIDENCE OF DEEP VENOUS THROMBOSIS IS IDENTIFIED.

## 2018-02-25 NOTE — ED
Lower Extremity





- HPI Summary


HPI Summary: 


43-year-old male presents with possible right leg blood clot.  He states he has 

been having occasionally pain in right calf.  he foot has been going numbness 

and tingling.  He states feels pulsating in his foot.  He denies any swelling.  

He sees sometimes his foot has been cramping.  He is a runner.  He denies any 

chest pressure or SOB.  He is not a smoker.  He denies any family history of 

blood clots.  He denies any recent travel or surgeries.  He denies any back 

pain.  He denies any saddle anaesthesia or loss of bowel or bladder. 








- History of Current Complaint


Chief Complaint: EDExtremityLower


Stated Complaint: POSSIBLE RT LEG BLOOD CLOT


Time Seen by Provider: 02/25/18 20:31


Pain Intensity: 2





- Allergies/Home Medications


Allergies/Adverse Reactions: 


 Allergies











Allergy/AdvReac Type Severity Reaction Status Date / Time


 


cefaclor Allergy  Itching Verified 02/15/18 11:37


 


morphine Allergy  Itching Verified 02/15/18 11:37














PMH/Surg Hx/FS Hx/Imm Hx


Endocrine/Hematology History: 


   Denies: Hx Anticoagulant Therapy, Hx Diabetes, Hx Thyroid Disease


Cardiovascular History: 


   Denies: Hx Congestive Heart Failure, Hx Deep Vein Thrombosis, Hx 

Hypercholesterolemia, Hx Hypertension, Hx Myocardial Infarction, Hx Pacemaker/

ICD, Hx Peripheral Vascular Disease


Respiratory History: 


   Denies: Hx Asthma, Hx Chronic Obstructive Pulmonary Disease (COPD), Hx Lung 

Cancer, Hx Pneumonia, Hx Pulmonary Embolism


GI History: 


   Denies: Hx Gall Bladder Disease, Hx Gastrointestinal Bleed, Hx Ulcer, Hx 

Urosepsis


 History: 


   Denies: Hx Dialysis, Hx Kidney Stones, Hx Renal Disease


Musculoskeletal History: 


   Denies: Hx Arthritis, Hx Rheumatoid Arthritis, Hx Osteoporosis


Sensory History: 


   Denies: Hx Cataracts, Hx Contacts or Glasses, Hx Glaucoma, Hx Hearing Aid


Opthamlomology History: 


   Denies: Hx Cataracts, Hx Contacts or Glasses, Hx Glaucoma


Neurological History: 


   Denies: Hx Dementia, Hx Headaches, Hx Migraine, Hx Seizures, Hx Transient 

Ischemic Attacks (TIA)


Psychiatric History: 


   Denies: Hx Anxiety, Hx Depression, Hx Panic Disorder, Hx Schizophrenia, Hx 

Bipolar Disorder





- Surgical History


Surgery Procedure, Year, and Place: TONSILECTOMY 2008


Infectious Disease History: No


Infectious Disease History: 


   Denies: Traveled Outside the [place] in Last 30 Days





- Family History


Known Family History: Positive: Cardiac Disease, Hypertension, Other - ITP, 

Breast Cancer





- Social History


Alcohol Use: None


Hx Substance Use: No


Substance Use Type: Reports: None


Hx Tobacco Use: No


Smoking Status (MU): Never Smoked Tobacco





Review of Systems


Negative: Fever


Negative: Chest Pain


Negative: Shortness Of Breath


Positive: Myalgia - right calf pain


Positive: Paresthesia


All Other Systems Reviewed And Are Negative: Yes





Physical Exam


Triage Information Reviewed: Yes


Vital Signs On Initial Exam: 


 Initial Vitals











Temp Pulse Resp BP Pulse Ox


 


 97.7 F   66   15   140/85   100 


 


 02/25/18 19:00  02/25/18 19:00  02/25/18 19:00  02/25/18 19:00  02/25/18 19:00











Vital Signs Reviewed: Yes


Appearance: Positive: Well-Appearing


Skin: Positive: Warm, Dry


Head/Face: Positive: Normal Head/Face Inspection


Eyes: Positive: Normal, Conjunctiva Clear


Respiratory/Lung Sounds: Positive: Clear to Auscultation, Breath Sounds Present


Cardiovascular: Positive: Normal, RRR


Musculoskeletal: Positive: Strength/ROM Intact - right calf, Other - nontender 

back, neg SLR, sensation grossly intact, good pulses, capillary refill<2 secs.  

Negative: Christopher Sign Right, Edema Right


Neurological: Positive: Normal


Psychiatric: Positive: Normal





Diagnostics





- Vital Signs


 Vital Signs











  Temp Pulse Resp BP Pulse Ox


 


 02/25/18 19:00  97.7 F  66  15  140/85  100














- Laboratory


Result Diagrams: 


 02/25/18 21:30


Lab Statement: Any lab studies that have been ordered have been reviewed, and 

results considered in the medical decision making process.





- Ultrasound


  ** No standard instances


Ultrasound Interpretation: No Acute Changes


Ultrasound Interpretation Completed By: Radiologist





Lower Extremity Course/Dx





- Course


Course Of Treatment: 43-year-old male presents with possible right leg blood 

clot.  He states he has been having occasionally pain in right cald.  he foot 

has been going numbness and tingling.  He states feels pulsating in his foot.  

He denies any swelling.  He sees sometimes his foot has been cramping.  He is a 

runner.  He denies any chest pressure or SOB.  He is not a smoker.  He denies 

any family history of blood clots.  He denies any recent travel or surgeries.  

On exam nontender calf muscle.  Neurovascularly intact.  Negative Homans sign.  

Ultrasound negative.  Electrolytes are normal.  We will discharge to follow-up 

with primary.  Patient understands and agrees with plan.





- Diagnoses


Differential Diagnosis/HQI/PQRI: Positive: DVT, Other - electrolytes anormality

, lumbar radiculopathy


Provider Diagnoses: 


 Right leg paresthesias








Discharge





- Discharge Plan


Condition: Good


Disposition: HOME


Patient Education Materials:  Paresthesia (ED)


Referrals: 


Ramon Marin MD [Primary Care Provider] - 


Additional Instructions: 


Try Tylenol or ibuprofen for pain


Follow up with primary


Return to ED if develop any new or worsening symptoms